# Patient Record
(demographics unavailable — no encounter records)

---

## 2024-12-06 NOTE — DATA REVIEWED
[MRI] : MRI [Bilateral] : of the bilateral [Shoulder] : shoulder [I independently reviewed and interpreted images and report] : I independently reviewed and interpreted images and report [FreeTextEntry1] : massive rotator cuff tear retracted to glenoid with associated atrophy

## 2024-12-06 NOTE — HISTORY OF PRESENT ILLNESS
[de-identified] : Date of initial evaluation: 12/06/2024 Patient age: 61 year Body part causing symptoms: B/L shoulders (R>L)  Location of the pain: superior, lateral  Pain score today: 10/10 Duration of symptoms: 05/12/2023 History of injury: felt a pop while lifting a trailer door at work  Activities that worsen the pain: exercise, sleep, OH movements, sudden movements  Radicular symptoms: yes, associated neck pain, working on adding cervical to claim Medications attempted for this problem: Motrin  PT for this problem: has been in PT 1x/week since injury  Injections for this problem: none Previous surgery on this body part: none  Prior imaging: x-ray  Occupation:    Worker's compensation claim: yes No fault claim: no Date of injury: 05/12/2023 History of prior pain in this body part: none  Work status since the injury: out of work  Transportation to today's visit: drove himself

## 2024-12-06 NOTE — IMAGING
[de-identified] : (Exam: Shoulder)   Laterality is bilateral   Patient is in no acute distress, alert and oriented Sensation is grossly intact to light touch in the hand Motor function is 5/5 in the hand Capillary refill is less than 2 seconds in the fingers Lymphadenopathy is not present Peripheral edema is not present   Skin is intact Swelling is not present Atrophy is not present Scapular winging is not present Deformity of the AC joint is not present Deformity of the biceps is not present   Bicipital groove tenderness is present AC joint tenderness is not present Scapulothoracic tenderness is not present   Active forward elevation is 120 Passive forward elevation is 150 External rotation at the side is 30 Internal rotation behind the back is to the level of T12   Forward elevation strength is 3/5 External rotation strength at the side is 4/5 Internal rotation strength at the side is 4/5 Deltoid strength of anterior, posterior and lateral heads is 5/5   Yuen test is abnormal OBriens test is abnormal Empty can test is abnormal Speeds test is abnormal Cross body adduction test is normal Belly press test is normal Apprehension and relocation is normal Sulcus sign is normal

## 2024-12-06 NOTE — DISCUSSION/SUMMARY
[de-identified] : (Impression) -bilateral (right>left) shoulder rotator cuff massive tear  The diagnosis was explained in detail. The potential non-surgical and surgical treatments were reviewed. The relative risks and benefits of each option were considered relative to the patient age, activity level, medical history, symptom severity and previously attempted treatments.  The patient was advised to consult with their primary medical provider prior to initiation of any new medications to reduce the risk of adverse effects specific to their long-term home medications and medical history. The risk of gastrointestinal irritation and kidney injury specific to long-term NSAID use was discussed.    (Plan)  -Continue physical therapy for stretching and strengthening. -Cortisone injection is deferred at this time. Patient states he wants to think about the injection and will return next week if he wishes to proceed.  -Meloxicam for pain and inflammation, take as needed. -Discussed that if symptoms persist we would consider surgical options. Given patient's desire to return to pre-injury employment as , and his young age, would likely attempt arthroscopic rotator cuff repair vs debridement prior to shoulder arthroplasty. Discussed the prolonged recovery and risk of re-tear given the tear size and atrophy. However, patient wishes to avoid replacement at this time. -Recommend cervical evaluation for radicular symptoms pending insurance approval.   -Work status remains out of work.  -Follow up in 1 week if he wishes to proceed with cortisone injections.    (MDM) Problem Complexity -Moderate: acute, complicated injury   Risk -Moderate: prescription medication   Visit Type -New: Patient has not been seen by another provider in my practice within the past 2 years who specializes in orthopedic surgery.

## 2024-12-06 NOTE — WORK
[Total (100%)] : total (100%) [Does not reveal pre-existing condition(s) that may affect treatment/prognosis] : does not reveal pre-existing condition(s) that may affect treatment/prognosis [Cannot return to work because ________] : cannot return to work because [unfilled] [Patient] : patient [No Rx restrictions] : No Rx restrictions. [I provided the services listed above] :  I provided the services listed above. [FreeTextEntry1] : fair [Less than Sedentary Work:] :  Less than Sedentary Work: Unable to meet the requirement of Sedentary Work.